# Patient Record
Sex: MALE | Race: WHITE | NOT HISPANIC OR LATINO | Employment: FULL TIME
[De-identification: names, ages, dates, MRNs, and addresses within clinical notes are randomized per-mention and may not be internally consistent; named-entity substitution may affect disease eponyms.]

---

## 2019-10-01 ENCOUNTER — HEALTH MAINTENANCE LETTER (OUTPATIENT)
Age: 49
End: 2019-10-01

## 2021-01-04 ENCOUNTER — HOSPITAL ENCOUNTER (EMERGENCY)
Facility: CLINIC | Age: 51
Discharge: HOME | End: 2021-01-04
Attending: EMERGENCY MEDICINE | Admitting: EMERGENCY MEDICINE
Payer: OTHER GOVERNMENT

## 2021-01-04 ENCOUNTER — APPOINTMENT (OUTPATIENT)
Dept: CT IMAGING | Facility: CLINIC | Age: 51
End: 2021-01-04
Attending: EMERGENCY MEDICINE
Payer: OTHER GOVERNMENT

## 2021-01-04 VITALS
BODY MASS INDEX: 30.43 KG/M2 | SYSTOLIC BLOOD PRESSURE: 134 MMHG | RESPIRATION RATE: 14 BRPM | DIASTOLIC BLOOD PRESSURE: 94 MMHG | TEMPERATURE: 98 F | WEIGHT: 250 LBS | HEART RATE: 89 BPM | OXYGEN SATURATION: 98 %

## 2021-01-04 DIAGNOSIS — K52.9 COLITIS: ICD-10-CM

## 2021-01-04 LAB
ALBUMIN SERPL-MCNC: 3.6 G/DL (ref 3.4–5)
ALBUMIN UR-MCNC: 10 MG/DL
ALP SERPL-CCNC: 78 U/L (ref 40–150)
ALT SERPL W P-5'-P-CCNC: 29 U/L (ref 0–70)
ANION GAP SERPL CALCULATED.3IONS-SCNC: <1 MMOL/L (ref 3–14)
APPEARANCE UR: CLEAR
AST SERPL W P-5'-P-CCNC: 24 U/L (ref 0–45)
BASOPHILS # BLD AUTO: 0.1 10E9/L (ref 0–0.2)
BASOPHILS NFR BLD AUTO: 0.6 %
BILIRUB SERPL-MCNC: 0.7 MG/DL (ref 0.2–1.3)
BILIRUB UR QL STRIP: NEGATIVE
BUN SERPL-MCNC: 15 MG/DL (ref 7–30)
CALCIUM SERPL-MCNC: 8.7 MG/DL (ref 8.5–10.1)
CHLORIDE SERPL-SCNC: 103 MMOL/L (ref 94–109)
CO2 SERPL-SCNC: 32 MMOL/L (ref 20–32)
COLOR UR AUTO: YELLOW
CREAT SERPL-MCNC: 1.31 MG/DL (ref 0.66–1.25)
DIFFERENTIAL METHOD BLD: NORMAL
EOSINOPHIL # BLD AUTO: 0.1 10E9/L (ref 0–0.7)
EOSINOPHIL NFR BLD AUTO: 1.5 %
ERYTHROCYTE [DISTWIDTH] IN BLOOD BY AUTOMATED COUNT: 12.9 % (ref 10–15)
GFR SERPL CREATININE-BSD FRML MDRD: 63 ML/MIN/{1.73_M2}
GLUCOSE SERPL-MCNC: 83 MG/DL (ref 70–99)
GLUCOSE UR STRIP-MCNC: NEGATIVE MG/DL
HCT VFR BLD AUTO: 50.2 % (ref 40–53)
HGB BLD-MCNC: 16.6 G/DL (ref 13.3–17.7)
HGB UR QL STRIP: ABNORMAL
IMM GRANULOCYTES # BLD: 0 10E9/L (ref 0–0.4)
IMM GRANULOCYTES NFR BLD: 0.3 %
KETONES UR STRIP-MCNC: NEGATIVE MG/DL
LEUKOCYTE ESTERASE UR QL STRIP: NEGATIVE
LIPASE SERPL-CCNC: 101 U/L (ref 73–393)
LYMPHOCYTES # BLD AUTO: 2 10E9/L (ref 0.8–5.3)
LYMPHOCYTES NFR BLD AUTO: 23.7 %
MCH RBC QN AUTO: 29.2 PG (ref 26.5–33)
MCHC RBC AUTO-ENTMCNC: 33.1 G/DL (ref 31.5–36.5)
MCV RBC AUTO: 88 FL (ref 78–100)
MONOCYTES # BLD AUTO: 0.9 10E9/L (ref 0–1.3)
MONOCYTES NFR BLD AUTO: 10.1 %
MUCOUS THREADS #/AREA URNS LPF: PRESENT /LPF
NEUTROPHILS # BLD AUTO: 5.5 10E9/L (ref 1.6–8.3)
NEUTROPHILS NFR BLD AUTO: 63.8 %
NITRATE UR QL: NEGATIVE
NRBC # BLD AUTO: 0 10*3/UL
NRBC BLD AUTO-RTO: 0 /100
PH UR STRIP: 5.5 PH (ref 5–7)
PLATELET # BLD AUTO: 276 10E9/L (ref 150–450)
POTASSIUM SERPL-SCNC: 3.9 MMOL/L (ref 3.4–5.3)
PROT SERPL-MCNC: 7.9 G/DL (ref 6.8–8.8)
RBC # BLD AUTO: 5.68 10E12/L (ref 4.4–5.9)
RBC #/AREA URNS AUTO: 3 /HPF (ref 0–2)
SODIUM SERPL-SCNC: 135 MMOL/L (ref 133–144)
SOURCE: ABNORMAL
SP GR UR STRIP: 1.02 (ref 1–1.03)
SQUAMOUS #/AREA URNS AUTO: <1 /HPF (ref 0–1)
UROBILINOGEN UR STRIP-MCNC: 2 MG/DL (ref 0–2)
WBC # BLD AUTO: 8.6 10E9/L (ref 4–11)
WBC #/AREA URNS AUTO: 1 /HPF (ref 0–5)

## 2021-01-04 PROCEDURE — 87506 IADNA-DNA/RNA PROBE TQ 6-11: CPT | Performed by: EMERGENCY MEDICINE

## 2021-01-04 PROCEDURE — 81001 URINALYSIS AUTO W/SCOPE: CPT | Performed by: EMERGENCY MEDICINE

## 2021-01-04 PROCEDURE — 80053 COMPREHEN METABOLIC PANEL: CPT | Performed by: EMERGENCY MEDICINE

## 2021-01-04 PROCEDURE — 87636 SARSCOV2 & INF A&B AMP PRB: CPT | Performed by: EMERGENCY MEDICINE

## 2021-01-04 PROCEDURE — 83690 ASSAY OF LIPASE: CPT | Performed by: EMERGENCY MEDICINE

## 2021-01-04 PROCEDURE — 74177 CT ABD & PELVIS W/CONTRAST: CPT

## 2021-01-04 PROCEDURE — 99285 EMERGENCY DEPT VISIT HI MDM: CPT | Mod: 25

## 2021-01-04 PROCEDURE — C9803 HOPD COVID-19 SPEC COLLECT: HCPCS

## 2021-01-04 PROCEDURE — 250N000011 HC RX IP 250 OP 636: Performed by: EMERGENCY MEDICINE

## 2021-01-04 PROCEDURE — 250N000009 HC RX 250: Performed by: EMERGENCY MEDICINE

## 2021-01-04 PROCEDURE — 87493 C DIFF AMPLIFIED PROBE: CPT | Mod: 59 | Performed by: EMERGENCY MEDICINE

## 2021-01-04 PROCEDURE — 85025 COMPLETE CBC W/AUTO DIFF WBC: CPT | Performed by: EMERGENCY MEDICINE

## 2021-01-04 RX ORDER — IOPAMIDOL 755 MG/ML
500 INJECTION, SOLUTION INTRAVASCULAR ONCE
Status: COMPLETED | OUTPATIENT
Start: 2021-01-04 | End: 2021-01-04

## 2021-01-04 RX ADMIN — SODIUM CHLORIDE 65 ML: 9 INJECTION, SOLUTION INTRAVENOUS at 20:28

## 2021-01-04 RX ADMIN — IOPAMIDOL 100 ML: 755 INJECTION, SOLUTION INTRAVENOUS at 20:28

## 2021-01-04 ASSESSMENT — ENCOUNTER SYMPTOMS
NAUSEA: 0
VOMITING: 0
FEVER: 0
ABDOMINAL PAIN: 1

## 2021-01-04 NOTE — ED AVS SNAPSHOT
[facility] Emergency [facility]  [address]  [facility] [address]  Phone: [phone]  Fax: [phone]                                    Kishan Bazzi Jr   MRN: [id number]    Department: [facility] Emergency Dept   Date of Visit: 1/4/2021           After Visit Summary Signature Page    I have received my discharge instructions, and my questions have been answered. I have discussed any challenges I see with this plan with the nurse or doctor.    ..........................................................................................................................................  Patient/Patient Representative Signature      ..........................................................................................................................................  Patient Representative Print Name and Relationship to Patient    ..................................................               ................................................  Date                                   Time    ..........................................................................................................................................  Reviewed by Signature/Title    ...................................................              ..............................................  Date                                               Time          22EPIC Rev 08/18

## 2021-01-05 ENCOUNTER — TELEPHONE (OUTPATIENT)
Dept: EMERGENCY MEDICINE | Facility: CLINIC | Age: 51
End: 2021-01-05

## 2021-01-05 LAB
C COLI+JEJUNI+LARI FUSA STL QL NAA+PROBE: ABNORMAL
C DIFF TOX B STL QL: NEGATIVE
EC STX1 GENE STL QL NAA+PROBE: NOT DETECTED
EC STX2 GENE STL QL NAA+PROBE: NOT DETECTED
ENTERIC PATHOGEN COMMENT: ABNORMAL
FLUABV+SARS-COV-2+RSV PNL RESP NAA+PROBE: NEGATIVE
FLUABV+SARS-COV-2+RSV PNL RESP NAA+PROBE: NEGATIVE
LABORATORY COMMENT REPORT: NORMAL
NOROV GI+II ORF1-ORF2 JNC STL QL NAA+PR: NOT DETECTED
RSV RNA SPEC QL NAA+PROBE: NEGATIVE
RVA NSP5 STL QL NAA+PROBE: NOT DETECTED
SALMONELLA SP RPOD STL QL NAA+PROBE: NOT DETECTED
SARS-COV-2 RNA SPEC QL NAA+PROBE: NEGATIVE
SHIGELLA SP+EIEC IPAH STL QL NAA+PROBE: NOT DETECTED
SPECIMEN SOURCE: NORMAL
SPECIMEN SOURCE: NORMAL
V CHOL+PARA RFBL+TRKH+TNAA STL QL NAA+PR: NOT DETECTED
Y ENTERO RECN STL QL NAA+PROBE: NOT DETECTED

## 2021-01-05 NOTE — TELEPHONE ENCOUNTER
"[facility] Emergency Department/Urgent Care Lab result notification     Patient/parent Name  Robby    RN Assessment (Patient s current Symptoms), include time called.  [Insert Left message here if message left]  1:35PM: Patient returned call. States that he is feeling the same as yesterday, not worse. Continues to have some \"pressure\" in the right lower abdomen and is having loose stools. Has been able to eat, has drank some coffee and has drank 4 glasses of water today. Urinating normal amounts. Denies any nausea, vomiting or fever.   Lab result (if applicable):  Final Enteric Bacteria and Virus Panel by JOYCELYN Stool is POSITIVE for Campylobacter group by JOYCELYN  Patient to be notified, symptom's assessed and advised per [place] ED lab result protocol.  RN Recommendations/Instructions per [place] ED lab result protocol  Patient notified of lab result and treatment recommendations.    RN reviewed information about Campylobacter from protocol RN action and RN reference guide.   Advised to not use any antidiarrheal medications, wash hands well.   Advised to follow up with the PCP as directed by the ED provider.  The patient is comfortable with the information given and has no further questions.     Please Contact your PCP clinic or return to the Emergency department if your    Symptoms worsen or other concerning symptom's.    PCP follow-up Questions asked: YES       [RN Name]  Becky Fish RN  [facility] RN  Lung Nodule and ED Lab Result RN  Epic pool (ED late result f/u RN): [id number]  [facility] INCIDENTAL RADIOLOGY F/U NURSES: P [id number]  # [phone]      "

## 2021-01-05 NOTE — ED PROVIDER NOTES
History   Chief Complaint:  Abdominal Pain       HPI   Kishan Bazzi Jr is a 50 year old male with history of intussusception who presents with abdominal pain. Per the patient, for the past 2 days he has had worsening RLQ abdominal pain which worsens with movement. He states it feels similar to one of his prior episodes of intussusception. He also endorses loose, yellow, stools but not quite diarrhea. He denies fever, nausea, and vomiting.  He notes his daughter is sick with similar symptoms, and currently has stool testing pending.  She was apparently vomiting 3 days ago.    Review of Systems   Constitutional: Negative for fever.   Gastrointestinal: Positive for abdominal pain. Negative for nausea and vomiting.   All other systems reviewed and are negative.      Allergies:  The patient has no known allergies.     Medications:  Allegra    Past Medical History:    Lipoma  Umbilical hernia  Asthma  Allergic rhinitis   Intussusception    Past Surgical History:    Ventral hernia repair  Appendectomy  Tonsillectomy and adenoidectomy  Biceps tendon repair, left    Social History:  The patient was unaccompanied to the ER  Alcohol Use: Negative  Tobacco Use: Negative    Physical Exam     Patient Vitals for the past 24 hrs:   BP Temp Temp src Pulse Resp SpO2 Weight   01/04/21 2000 119/89 -- -- 89 -- 99 % --   01/04/21 1828 138/88 98  F (36.7  C) Oral 83 14 97 % 113.4 kg (250 lb)       Physical Exam   Nursing note and vitals reviewed.  Constitutional: Cooperative.   HENT:   Mouth/Throat: Moist mucous membranes.   Eyes: EOMI, nonicteric sclera  Cardiovascular: Normal rate, regular rhythm, no murmurs, rubs, or gallops  Pulmonary/Chest: Effort normal and breath sounds normal. No respiratory distress. No wheezes. No rales.   Abdominal: Soft. Mild right lower quadrant TTP, nondistended, no guarding or rigidity.   Musculoskeletal: Normal range of motion.   Neurological: Alert. Moves all extremities spontaneously.   Skin: Skin  is warm and dry. No rash noted.   Psychiatric: Normal mood and affect.     Emergency Department Course     Imaging:    Abd/pelvis CT,  IV  contrast only TRAUMA / AAA   1.  Inflammatory wall thickening involving the distal ileum and much of the hemicolon consistent with an inflammatory/infectious ileocolitis.  Imaging independently reviewed and agree with radiologist interpretation.     The above imaging workup was performed.     Laboratory:    CBC: WBC 8.6, HGB 16.6,   CMP: Anion Gap <1(L), Creatinine 1.31(H) o/w WNL  Lipase: 101    UA with micro: Blood Trace(A), Protein Albumin 10(A), RBC 3(H), Mucous Present(A) o/w WNL    Symptomatic COVID-19 and Influenza by PCR Swab: In Process    Emergency Department Course:    Reviewed:  I reviewed nursing notes, vitals, past medical history and care everywhere    Assessments:  1957 I performed a physical exam of the patient as documented above.  2112 Patient rechecked and updated. He informed me the daughter was vomiting and had similar symptoms about 3 days ago. He was recently in [place] visiting his mother who tested positive for COVID 2 weeks prior to his arrival.    Disposition:  The patient was discharged to home.       Impression & Plan   Covid-19  Kishan Bazzi Jr was evaluated during a global COVID-19 pandemic, which necessitated consideration that the patient might be at risk for infection with the SARS-CoV-2 virus that causes COVID-19.   Applicable protocols for evaluation were followed during the patient's care.   COVID-19 was considered as part of the patient's evaluation. The plan for testing is:  a test was obtained during this visit.    Medical Decision Making:  Patient presents with chief complaint right lower quadrant abdominal pain for 48 hours.  He has pertinent history of intussusception, and feels that symptoms are similar.  He is not having bloody stool.  Exam is equivocal, but given his history, CT imaging was obtained.  CT is notable  for diffuse ileocolitis.  Given he has a sick contact, this most likely represents infectious colitis.  We did discuss possibility of inflammatory colitis.  Highly doubt ischemic colitis given the distribution.  Stool studies were sent while patient was in ED.  Discussed with him that this is usually a self-limited illness, and should improve on its own.  Zofran prescribed for home.  With otherwise negative CT and unremarkable labs, doubt other emergent pathology.  He is safe/stable for discharge home.  All questions answered.    Diagnosis:    ICD-10-CM    1. Colitis  K52.9      Scribe Disclosure:  I, Wong Yeung, am serving as a scribe at 7:46 PM on 1/4/2021 to document services personally performed by Girish Nunez MD based on my observations and the provider's statements to me.            Girish Nunez MD  01/05/21 6425

## 2021-01-05 NOTE — TELEPHONE ENCOUNTER
[facility] Emergency Department Lab result notification [Adult-Male]    [place] ED lab result protocol used  Campylobacter protocol    Reason for call  Notify of lab results, assess symptoms,  review ED providers recommendations/discharge instructions (if necessary) and advise per ED lab result f/u protocol    Lab Result (including Rx patient on, if applicable)  Final Enteric Bacteria and Virus Panel by JOYCELYN Stool is POSITIVE for Campylobacter group by JOYCELYN  Patient to be notified, symptom's assessed and advised per [place] ED lab result protocol.      Information table from ED Provider visit on 1/4/2020  Symptoms reported at ED visit (Chief complaint, HPI) Abdominal Pain        HPI   Kishan Bazzi Jr is a 50 year old male with history of intussusception who presents with abdominal pain. Per the patient, for the past 2 days he has had worsening RLQ abdominal pain which worsens with movement. He states it feels similar to one of his prior episodes of intussusception. He also endorses loose, yellow, stools but not quite diarrhea. He denies fever, nausea, and vomiting.  He notes his daughter is sick with similar symptoms, and currently has stool testing pending.  She was apparently vomiting 3 days ago.   Significant Medical hx, if applicable (i.e. CKD, diabetes) None   Allergies No Known Allergies   Weight, if applicable Wt Readings from Last 2 Encounters:   01/04/21 113.4 kg (250 lb)   08/27/16 113.4 kg (250 lb)      Coumadin/Warfarin [Yes /No] No   Creatinine Level (mg/dl) Creatinine   Date Value Ref Range Status   01/04/2021 1.31 (H) 0.66 - 1.25 mg/dL Final      Creatinine clearance (ml/min), if applicable Creatinine clearance cannot be calculated (Unknown ideal weight.) CrCl est 108.21   ED providers Impression and Plan (applicable information) Covid-19  Kishan Bazzi Jr was evaluated during a global COVID-19 pandemic, which necessitated consideration that the patient might be at risk for  infection with the SARS-CoV-2 virus that causes COVID-19.   Applicable protocols for evaluation were followed during the patient's care.   COVID-19 was considered as part of the patient's evaluation. The plan for testing is:  a test was obtained during this visit.     Medical Decision Making:  Patient presents with chief complaint right lower quadrant abdominal pain for 48 hours.  He has pertinent history of intussusception, and feels that symptoms are similar.  He is not having bloody stool.  Exam is equivocal, but given his history, CT imaging was obtained.  CT is notable for diffuse ileocolitis.  Given he has a sick contact, this most likely represents infectious colitis.  We did discuss possibility of inflammatory colitis.  Highly doubt ischemic colitis given the distribution.  Stool studies were sent while patient was in ED.  Discussed with him that this is usually a self-limited illness, and should improve on its own.  Zofran prescribed for home.  With otherwise negative CT and unremarkable labs, doubt other emergent pathology.  He is safe/stable for discharge home.  All questions answered.   ED diagnosis Colitis    ED provider Girish Nunez MD      RN Assessment (Patient s current Symptoms), include time called.  [Insert Left message here if message left]  10:33AM: Left voicemail message requesting a call back to [facility] ED Lab Result RN at [phone].  RN is available every day between 10 a.m. and 6:30 p.m..      [RN Name]  Becky Fish RN  [facility] RN  Lung Nodule and ED Lab Result RN  Epic pool (ED late result f/u RN): [id number]  [facility] INCIDENTAL RADIOLOGY F/U NURSES: P [id number]  Ph# [phone]      Copy of Lab result  Enteric Bacteria and Virus Panel by JOYCELYN Stool  Order: [id number]  Status:  Final result   Visible to patient:  Yes (MyChart) Dx:  Colitis  Specimen Information: Feces          Ref Range & Units 1d ago    Campylobacter group by JOYCELYN NDET^Not Detected Detected,  Abnormal ResultAbnormal      Salmonella species by JOYCELYN NDET^Not Detected Not Detected     Shigella species by JOYCELYN NDET^Not Detected Not Detected     Vibrio group by JOYCELYN NDET^Not Detected Not Detected     Rotavirus A by JOYCELYN NDET^Not Detected Not Detected     Shiga toxin 1 gene by JOYCELYN NDET^Not Detected Not Detected     Shiga toxin 2 gene by JOYCELYN NDET^Not Detected Not Detected     Norovirus I and II by JOYCELYN NDET^Not Detected Not Detected     Yersinia enterocolitica by JOYCELYN NDET^Not Detected Not Detected     Enteric pathogen comment  Testing performed by multiplexed, qualitative PCR using the [employer] Enteric   Pathogens Nucleic Acid Test. Results should not be used as the sole basis for diagnosis,   treatment, or other patient management decisions.    Comment: Positive results do not rule out co-infection with other organisms that are   not detected by this test, and may not be the sole or definitive cause of   patient illness.   Negative results in the setting of clinical illness compatible with   gastroenteritis may be due to infection by pathogens that are not detected by   this test or non-infectious causes such as ulcerative colitis, irritable bowel    syndrome, or Crohn's disease.   Note: Shiga toxin producing E. coli (STEC) typically harbor one or both genes   that encode for Shiga toxins 1 and 2.    Resulting Agency  [facility]         Specimen Collected: 01/04/21  9:50 PM Last Resulted: 01/05/21  6:57 AM

## 2021-01-05 NOTE — RESULT ENCOUNTER NOTE
Final Clostridium Difficile toxin B PCR is NEGATIVE.    No treatment or change in treatment per [place] ED Lab Result protocol.

## 2021-01-05 NOTE — ED TRIAGE NOTES
A&O x4, ABCs intact. Pt presents with RLQ abdominal pain that started 2 days ago. Pt denies nausea. Pt states that he was at urgent care and was sent to ED.

## 2021-01-15 ENCOUNTER — HEALTH MAINTENANCE LETTER (OUTPATIENT)
Age: 51
End: 2021-01-15

## 2021-09-04 ENCOUNTER — HEALTH MAINTENANCE LETTER (OUTPATIENT)
Age: 51
End: 2021-09-04

## 2022-02-13 ENCOUNTER — HEALTH MAINTENANCE LETTER (OUTPATIENT)
Age: 52
End: 2022-02-13

## 2022-08-20 ENCOUNTER — HOSPITAL ENCOUNTER (EMERGENCY)
Facility: CLINIC | Age: 52
Discharge: HOME | End: 2022-08-20
Attending: EMERGENCY MEDICINE | Admitting: EMERGENCY MEDICINE
Payer: OTHER GOVERNMENT

## 2022-08-20 VITALS
TEMPERATURE: 98.6 F | SYSTOLIC BLOOD PRESSURE: 157 MMHG | DIASTOLIC BLOOD PRESSURE: 107 MMHG | HEART RATE: 88 BPM | HEIGHT: 76 IN | RESPIRATION RATE: 18 BRPM | BODY MASS INDEX: 31.66 KG/M2 | OXYGEN SATURATION: 99 % | WEIGHT: 260 LBS

## 2022-08-20 DIAGNOSIS — K04.7 DENTAL ABSCESS: ICD-10-CM

## 2022-08-20 PROCEDURE — 99283 EMERGENCY DEPT VISIT LOW MDM: CPT

## 2022-08-20 PROCEDURE — 64400 NJX AA&/STRD TRIGEMINAL NRV: CPT

## 2022-08-20 RX ORDER — HYDROCODONE BITARTRATE AND ACETAMINOPHEN 5; 325 MG/1; MG/1
1 TABLET ORAL EVERY 6 HOURS PRN
Qty: 10 TABLET | Refills: 0 | Status: SHIPPED | OUTPATIENT
Start: 2022-08-20

## 2022-08-20 RX ORDER — CLINDAMYCIN HCL 300 MG
300 CAPSULE ORAL 4 TIMES DAILY
Qty: 40 CAPSULE | Refills: 0 | Status: SHIPPED | OUTPATIENT
Start: 2022-08-20 | End: 2022-08-30

## 2022-08-20 ASSESSMENT — ENCOUNTER SYMPTOMS
FEVER: 0
VOMITING: 0
NAUSEA: 0
DIARRHEA: 0

## 2022-08-20 NOTE — DISCHARGE INSTRUCTIONS
Please follow up with your dentist as soon as possible.    Discharge Instructions  Dental Pain    You have been seen today for a toothache. Your pain may be caused by an exposed nerve, an infection (pulpitis), a root abscess (pocket of pus), or other problems. You will need to see a dentist for a solution to your tooth problem. Emergency Department care is only to help control your problem until you can see a dentist; we cannot provide complete dental care.  Today, we did not find any sign that your toothache was caused by any dangerous or life-threatening condition, but sometimes symptoms develop over time and cannot be found during an emergency visit, so it is very important that you follow up with your dentist.      Generally, every Emergency Department visit should have a follow-up clinic visit with either a primary or a specialty clinic/provider. Please follow-up as instructed by your emergency provider today.    Return to the Emergency Department if:  You develop a new fever over 100.4 F.  You cannot open your mouth normally, cannot move your tongue well, or cannot swallow.  You have new or increased swelling of your face or neck.  You develop drainage of pus or foul smelling material from around your tooth.  What can I do to help myself?  Take any antibiotic the provider may have prescribed for you today.  Avoid very hot or very cold foods as both can cause pain.  Make an appointment to see a dentist as soon as possible. Dentists are generally not  on-staff  at hospitals so we cannot  refer  to you to dentist but we may be able to provide a list of dental clinics to help you.  If you were given a prescription for medicine here today, be sure to read all of the information (including the package insert) that comes with your prescription.  This will include important information about the medicine, its side effects, and any warnings that you need to know about.  The pharmacist who fills the prescription can  provide more information and answer questions you may have about the medicine.  If you have questions or concerns that the pharmacist cannot address, please call or return to the Emergency Department.   Remember that you can always come back to the Emergency Department if you are not able to see your regular provider in the amount of time listed above, if you get any new symptoms, or if there is anything that worries you.

## 2022-08-20 NOTE — ED TRIAGE NOTES
Patient reports right upper tooth pain since yesterday with progressive facial swelling today.  He is able to open and close with mouth fully and denies swelling under his tongue.He took tylenol and ibuprofen prior to coming to the ER but these medications have not improved the pain.  ABCs intact.  Patient is alert and oriented x3.

## 2022-10-16 ENCOUNTER — HEALTH MAINTENANCE LETTER (OUTPATIENT)
Age: 52
End: 2022-10-16

## 2023-02-24 ENCOUNTER — APPOINTMENT (OUTPATIENT)
Dept: GENERAL RADIOLOGY | Facility: CLINIC | Age: 53
End: 2023-02-24
Attending: EMERGENCY MEDICINE
Payer: OTHER GOVERNMENT

## 2023-02-24 ENCOUNTER — HOSPITAL ENCOUNTER (EMERGENCY)
Facility: CLINIC | Age: 53
Discharge: HOME | End: 2023-02-24
Attending: EMERGENCY MEDICINE | Admitting: EMERGENCY MEDICINE
Payer: OTHER GOVERNMENT

## 2023-02-24 VITALS
DIASTOLIC BLOOD PRESSURE: 113 MMHG | TEMPERATURE: 98.1 F | OXYGEN SATURATION: 99 % | RESPIRATION RATE: 18 BRPM | HEART RATE: 78 BPM | SYSTOLIC BLOOD PRESSURE: 155 MMHG

## 2023-02-24 DIAGNOSIS — R00.2 PALPITATIONS: ICD-10-CM

## 2023-02-24 DIAGNOSIS — R03.0 ELEVATED BLOOD PRESSURE READING WITHOUT DIAGNOSIS OF HYPERTENSION: ICD-10-CM

## 2023-02-24 DIAGNOSIS — R07.9 CHEST PAIN, UNSPECIFIED TYPE: ICD-10-CM

## 2023-02-24 LAB
ANION GAP SERPL CALCULATED.3IONS-SCNC: 12 MMOL/L (ref 7–15)
BASOPHILS # BLD AUTO: 0.1 10E3/UL (ref 0–0.2)
BASOPHILS NFR BLD AUTO: 1 %
BUN SERPL-MCNC: 16.9 MG/DL (ref 6–20)
CALCIUM SERPL-MCNC: 9.3 MG/DL (ref 8.6–10)
CHLORIDE SERPL-SCNC: 100 MMOL/L (ref 98–107)
CREAT SERPL-MCNC: 1.03 MG/DL (ref 0.67–1.17)
D DIMER PPP FEU-MCNC: 0.31 UG/ML FEU (ref 0–0.5)
DEPRECATED HCO3 PLAS-SCNC: 24 MMOL/L (ref 22–29)
EOSINOPHIL # BLD AUTO: 0.3 10E3/UL (ref 0–0.7)
EOSINOPHIL NFR BLD AUTO: 6 %
ERYTHROCYTE [DISTWIDTH] IN BLOOD BY AUTOMATED COUNT: 13.3 % (ref 10–15)
GFR SERPL CREATININE-BSD FRML MDRD: 87 ML/MIN/1.73M2
GLUCOSE SERPL-MCNC: 182 MG/DL (ref 70–99)
HCT VFR BLD AUTO: 49 % (ref 40–53)
HGB BLD-MCNC: 16.6 G/DL (ref 13.3–17.7)
HOLD SPECIMEN: NORMAL
IMM GRANULOCYTES # BLD: 0 10E3/UL
IMM GRANULOCYTES NFR BLD: 0 %
LYMPHOCYTES # BLD AUTO: 1.9 10E3/UL (ref 0.8–5.3)
LYMPHOCYTES NFR BLD AUTO: 37 %
MCH RBC QN AUTO: 30.3 PG (ref 26.5–33)
MCHC RBC AUTO-ENTMCNC: 33.9 G/DL (ref 31.5–36.5)
MCV RBC AUTO: 90 FL (ref 78–100)
MONOCYTES # BLD AUTO: 0.3 10E3/UL (ref 0–1.3)
MONOCYTES NFR BLD AUTO: 7 %
NEUTROPHILS # BLD AUTO: 2.4 10E3/UL (ref 1.6–8.3)
NEUTROPHILS NFR BLD AUTO: 49 %
NRBC # BLD AUTO: 0 10E3/UL
NRBC BLD AUTO-RTO: 0 /100
PLATELET # BLD AUTO: 252 10E3/UL (ref 150–450)
POTASSIUM SERPL-SCNC: 3.9 MMOL/L (ref 3.4–5.3)
RBC # BLD AUTO: 5.47 10E6/UL (ref 4.4–5.9)
SODIUM SERPL-SCNC: 136 MMOL/L (ref 136–145)
TROPONIN T SERPL HS-MCNC: 6 NG/L
TROPONIN T SERPL HS-MCNC: 7 NG/L
WBC # BLD AUTO: 5 10E3/UL (ref 4–11)

## 2023-02-24 PROCEDURE — 71046 X-RAY EXAM CHEST 2 VIEWS: CPT

## 2023-02-24 PROCEDURE — 80048 BASIC METABOLIC PNL TOTAL CA: CPT | Performed by: EMERGENCY MEDICINE

## 2023-02-24 PROCEDURE — 85025 COMPLETE CBC W/AUTO DIFF WBC: CPT | Performed by: EMERGENCY MEDICINE

## 2023-02-24 PROCEDURE — 36415 COLL VENOUS BLD VENIPUNCTURE: CPT | Performed by: EMERGENCY MEDICINE

## 2023-02-24 PROCEDURE — 84484 ASSAY OF TROPONIN QUANT: CPT | Mod: 91 | Performed by: EMERGENCY MEDICINE

## 2023-02-24 PROCEDURE — 99285 EMERGENCY DEPT VISIT HI MDM: CPT | Mod: 25

## 2023-02-24 PROCEDURE — 84484 ASSAY OF TROPONIN QUANT: CPT | Performed by: EMERGENCY MEDICINE

## 2023-02-24 PROCEDURE — 85379 FIBRIN DEGRADATION QUANT: CPT | Performed by: EMERGENCY MEDICINE

## 2023-02-24 PROCEDURE — 93005 ELECTROCARDIOGRAM TRACING: CPT

## 2023-02-24 ASSESSMENT — ENCOUNTER SYMPTOMS
DIAPHORESIS: 0
SHORTNESS OF BREATH: 0
FEVER: 0
COUGH: 0
ABDOMINAL PAIN: 0

## 2023-02-24 ASSESSMENT — ACTIVITIES OF DAILY LIVING (ADL): ADLS_ACUITY_SCORE: 35

## 2023-02-24 NOTE — ED PROVIDER NOTES
History     Chief Complaint:  Chest pain, palpitations     HPI   Kishan Bazzi Jr is a 52 year old male with a history of Asthma who presents with chest pressure and dizziness. The patient states he first felt the chest pressure at around 1200 yesterday after shoveling snow. He describes his chest pressure as feeling heaviness in his chest, and states that he still feels the heaviness now, but it is not as bad as yesterday. He reports his chest pressure as a tight, high stress feeling, and states that the pressure feels different than lung tightness. The patient states yesterday night, his heart was pounding and that he felt dizzy. He states today he still felt dizzy and decided to come to the ED. The patient reports minor pains in his jaw and some dull shoulder pain, but states he is unsure if this is related to his chest pressure. The patient states that his chest pressure is intermittent, and today upon arrival to the ED at 1200, was the worst the chest pressure felt since onset. He states that the chest pressure is not present currently, but still has dizziness when walking.  He states that he tried his inhaler for his symptoms and felt slightly better, but this did not resolve his symptoms. The patient reports he chews tobacco, but denies smoking. He denies shortness of breath, pain when breathing, leg swelling, recent illnesses, fever, cough, rhinorrhea, diaphoresis, nausea, history of heart issues, hypercholesterolemia, diabetes, recent surgeries, recent travel, and abdominal pain. Of note, the patient states his grandfather had CHF.     Independent Historian:   None - Patient Only    Review of External Notes: n/a     ROS:  Review of Systems   Constitutional: Negative for diaphoresis and fever.   Respiratory: Negative for cough and shortness of breath.    Cardiovascular: Positive for chest pain. Negative for leg swelling.   Gastrointestinal: Negative for abdominal pain.       Allergies:  The patient has  no known allergies.    Medications:    Advair    Past Medical History:    Asthma  Intussusception  Lipoma  Umbilical hernia  Allergic rhinitis    Past Surgical History:    Appendectomy  Ventral hernia repair  T&A  Left bicep tendon repair  Left ankle surgery    Social History:  The patient presents to the ED alone.  The patient arrived by private vehicle.   reports that he has never smoked. He does not have any smokeless tobacco history on file. He reports current alcohol use. He reports that he does not use drugs.  PCP: Craig Lane     Physical Exam     Patient Vitals for the past 24 hrs:   BP Temp Pulse Resp SpO2   02/24/23 1859 -- -- -- -- 99 %   02/24/23 1844 (!) 155/113 -- 78 -- 99 %   02/24/23 1829 (!) 156/109 -- 80 -- 95 %   02/24/23 1825 -- -- -- -- 98 %   02/24/23 1814 -- -- 69 -- 99 %   02/24/23 1810 (!) 141/103 -- -- -- 97 %   02/24/23 1755 (!) 152/102 -- -- -- 97 %   02/24/23 1740 (!) 150/108 -- -- -- 99 %   02/24/23 1729 -- -- -- -- 100 %   02/24/23 1725 (!) 145/105 -- -- -- 98 %   02/24/23 1714 (!) 148/95 -- 75 -- 99 %   02/24/23 1659 (!) 143/94 -- 74 -- 100 %   02/24/23 1646 129/88 -- 76 -- 97 %   02/24/23 1317 (!) 143/104 98.1  F (36.7  C) 81 18 98 %        Physical Exam  General: Alert, no acute distress  HEENT:  Moist mucous membranes. Conjunctiva normal.   CV:  RRR, no m/r/g, skin warm and well perfused  Pulm:  CTAB, no wheezes/ronchi/rales.  No acute distress, breathing comfortably  GI:  Soft, nontender, nondistended.  No rebound or guarding.    MSK:  Moving all extremities.  No focal areas of edema, erythema, or tenderness  Skin:  WWP, no rashes, skin color normal, no diaphoresis  Psych:  Well-appearing, normal affect, regular speech    Emergency Department Course   ECG  ECG taken at 1627, ECG read at 1640  Normal sinus rhythm  Normal ECG   No significant change as compared to prior, dated 8/25/16.  Rate 75 bpm. KS interval 192 ms. QRS duration 94 ms. QT/QTc 372/415 ms. P-R-T axes 20 -18 17.        Imaging:  Chest XR,  PA & LAT   Final Result   IMPRESSION: Stable chest with no acute cardiopulmonary findings seen to explain patient's chest pain clinically.         Report per radiology    Laboratory:  Labs Ordered and Resulted from Time of ED Arrival to Time of ED Departure   BASIC METABOLIC PANEL - Abnormal       Result Value    Sodium 136      Potassium 3.9      Chloride 100      Carbon Dioxide (CO2) 24      Anion Gap 12      Urea Nitrogen 16.9      Creatinine 1.03      Calcium 9.3      Glucose 182 (*)     GFR Estimate 87     TROPONIN T, HIGH SENSITIVITY - Normal    Troponin T, High Sensitivity 7     D DIMER QUANTITATIVE - Normal    D-Dimer Quantitative 0.31     TROPONIN T, HIGH SENSITIVITY - Normal    Troponin T, High Sensitivity 6     CBC WITH PLATELETS AND DIFFERENTIAL    WBC Count 5.0      RBC Count 5.47      Hemoglobin 16.6      Hematocrit 49.0      MCV 90      MCH 30.3      MCHC 33.9      RDW 13.3      Platelet Count 252      % Neutrophils 49      % Lymphocytes 37      % Monocytes 7      % Eosinophils 6      % Basophils 1      % Immature Granulocytes 0      NRBCs per 100 WBC 0      Absolute Neutrophils 2.4      Absolute Lymphocytes 1.9      Absolute Monocytes 0.3      Absolute Eosinophils 0.3      Absolute Basophils 0.1      Absolute Immature Granulocytes 0.0      Absolute NRBCs 0.0          Emergency Department Course & Assessments:         Independent Interpretation (X-rays, CTs, rhythm strip):  Chest x-ray shows no pneumothorax or lobar consolidation.    Consultations/Discussion of Management or Tests:  N/A       Social Determinants of Health affecting care:   None    Assessments:  1635 I checked the patient and obtained history.   I rechecked the patient and explained findings and discussed discharge.    Disposition:  The patient was discharged to home.     Impression & Plan      Medical Decision Makin-year-old male with history of asthma presenting to the ER for evaluation of chest  pain and palpitations with associated dizziness.  Please above for details of HPI and exam.  He is afebrile but noted to be slightly hypertensive.  He is well-appearing and not in respiratory distress.  Broad differentials considered including but is not limited to ACS, PE, aortic dissection, pneumonia, pneumothorax, GI etiology, musculoskeletal etiology, asthma exacerbation amongst other things.  His work-up here in the ER is reassuring.  EKG shows no acute ischemic appearing changes or signs of dysrhythmia.  High-sensitivity troponin and delta troponin are normal.  Patient's HEART score is low.  Furthermore, he is low risk for PE and D-dimer is normal making PE unlikely.  The rest of his basic lab studies are reassuring.  Chest x-ray shows no evidence of pneumothorax or lobar consolidation.  No mediastinal widening and I doubt acute or dissection based on history and exam.  Unclear cause for patient's overall symptoms but given his reassuring work-up, with reasonable clinical certainty, I do feel that he is safe to discharge home to follow-up with his primary care doctor regarding his visit today and with his elevated blood pressure readings.  Despite high blood pressure reading, no signs of endorgan injury.  I discussed with him the signs and symptoms that should prompt serge return to the ER.  All questions answered prior to discharge      Diagnosis:    ICD-10-CM    1. Chest pain, unspecified type  R07.9             Scribe Disclosure:  I, Herb Cross, am serving as a scribe at 5:53 PM on 2/24/2023 to document services personally performed by Brandon Hall MD, based on my observations and the provider's statements to me.   2/24/2023   Brandon Hall MD [place], Edgar Ronald, MD  02/24/23 1956

## 2023-02-25 NOTE — DISCHARGE INSTRUCTIONS
Discharge Instructions  Chest Pain    You have been seen today for chest pain or discomfort.  At this time, your provider has found no signs that your chest pain is due to a serious or life-threatening condition, (or you have declined more testing and/or admission to the hospital). However, sometimes there is a serious problem that does not show up right away. Your evaluation today may not be complete and you may need further testing and evaluation.     Generally, every Emergency Department visit should have a follow-up clinic visit with either a primary or a specialty clinic/provider. Please follow-up as instructed by your emergency provider today.  Return to the Emergency Department if:  Your chest pain changes, gets worse, starts to happen more often, or comes with less activity.  You are newly short of breath.  You get very weak or tired.  You pass out or faint.  You have any new symptoms, like fever, cough, numb legs, or you cough up blood.  You have anything else that worries you.    Until you follow-up with your regular provider, please do the following:  If a stress test appointment has been made, go to the appointment.  If you have questions, contact your regular provider.  Follow-up with your regular provider/clinic as directed; this is very important.    If you were given a prescription for medicine here today, be sure to read all of the information (including the package insert) that comes with your prescription.  This will include important information about the medicine, its side effects, and any warnings that you need to know about.  The pharmacist who fills the prescription can provide more information and answer questions you may have about the medicine.  If you have questions or concerns that the pharmacist cannot address, please call or return to the Emergency Department.       Remember that you can always come back to the Emergency Department if you are not able to see your regular provider in  the amount of time listed above, if you get any new symptoms, or if there is anything that worries you.    Discharge Instructions  Hypertension - High Blood Pressure    During you visit to the Emergency Department, your blood pressure was higher than the recommended blood pressure.  This may be related to stress, pain, medication or other temporary conditions. In these cases, your blood pressure may return to normal on its own. If you have a history of high blood pressure, you may need to have your provider adjust your medications. Sometimes, your high measurement here may indicate that you have developed high blood pressure that will stay high unless it is treated. As a general rule, high blood pressure causes problems over years rather than days, weeks, or months. So, while it is important to treat blood pressure, it is rarely important to treat blood pressure immediately. Occasionally we will begin a medication in the Emergency Department; more often we will recommend close follow-up for medications with a primary doctor/clinic.    Generally, every Emergency Department visit should have a follow-up clinic visit with either a primary or a specialty clinic/provider. Please follow-up as instructed by your emergency provider today.    Return to the Emergency Department if you start to have:  A severe headache.  Chest pain.  Shortness of breath.  Weakness or numbness that affects one part of the body.  Confusion.  Vision changes.  Significant swelling of legs and/or eyes.  A reaction to any medication started in the Emergency Department.    What can I do to help myself?  Avoid alcohol.  Take any blood pressure medicine that you are prescribed.  Get a good night s sleep.  Lower your salt intake.  Exercise.  Lose weight.  Manage stress.  See your doctor regularly    If blood pressure medication was started in the Emergency Department:  The medicine may not have an immediate effect. The body and brain determine what  blood pressure you have. The medicine s job is to retrain the body s  thermostat  to a lower blood pressure.  You will need to follow up with your provider to see how this medicine is working for you.  If you were given a prescription for medicine here today, be sure to read all of the information (including the package insert) that comes with your prescription.  This will include important information about the medicine, its side effects, and any warnings that you need to know about.  The pharmacist who fills the prescription can provide more information and answer questions you may have about the medicine.  If you have questions or concerns that the pharmacist cannot address, please call or return to the Emergency Department.   Remember that you can always come back to the Emergency Department if you are not able to see your regular provider in the amount of time listed above, if you get any new symptoms, or if there is anything that worries you.

## 2023-02-27 LAB
ATRIAL RATE - MUSE: 75 BPM
ATRIAL RATE - MUSE: 75 BPM
DIASTOLIC BLOOD PRESSURE - MUSE: NORMAL MMHG
DIASTOLIC BLOOD PRESSURE - MUSE: NORMAL MMHG
INTERPRETATION ECG - MUSE: NORMAL
INTERPRETATION ECG - MUSE: NORMAL
P AXIS - MUSE: 20 DEGREES
P AXIS - MUSE: 25 DEGREES
PR INTERVAL - MUSE: 184 MS
PR INTERVAL - MUSE: 192 MS
QRS DURATION - MUSE: 84 MS
QRS DURATION - MUSE: 94 MS
QT - MUSE: 364 MS
QT - MUSE: 372 MS
QTC - MUSE: 406 MS
QTC - MUSE: 415 MS
R AXIS - MUSE: -18 DEGREES
R AXIS - MUSE: -9 DEGREES
SYSTOLIC BLOOD PRESSURE - MUSE: NORMAL MMHG
SYSTOLIC BLOOD PRESSURE - MUSE: NORMAL MMHG
T AXIS - MUSE: 17 DEGREES
T AXIS - MUSE: 4 DEGREES
VENTRICULAR RATE- MUSE: 75 BPM
VENTRICULAR RATE- MUSE: 75 BPM

## 2023-03-26 ENCOUNTER — HEALTH MAINTENANCE LETTER (OUTPATIENT)
Age: 53
End: 2023-03-26

## 2023-06-06 ENCOUNTER — HOSPITAL ENCOUNTER (EMERGENCY)
Facility: CLINIC | Age: 53
Discharge: ANOTHER HOSPITAL | End: 2023-06-06
Attending: EMERGENCY MEDICINE | Admitting: EMERGENCY MEDICINE
Payer: COMMERCIAL

## 2023-06-06 VITALS
OXYGEN SATURATION: 99 % | TEMPERATURE: 97 F | DIASTOLIC BLOOD PRESSURE: 74 MMHG | SYSTOLIC BLOOD PRESSURE: 123 MMHG | BODY MASS INDEX: 26.94 KG/M2 | WEIGHT: 221.34 LBS | RESPIRATION RATE: 9 BRPM | HEART RATE: 86 BPM

## 2023-06-06 DIAGNOSIS — E13.10 DIABETIC KETOACIDOSIS WITHOUT COMA ASSOCIATED WITH OTHER SPECIFIED DIABETES MELLITUS (H): ICD-10-CM

## 2023-06-06 LAB
ALBUMIN SERPL BCG-MCNC: 4.5 G/DL (ref 3.5–5.2)
ALBUMIN UR-MCNC: 30 MG/DL
ALP SERPL-CCNC: 142 U/L (ref 40–129)
ALT SERPL W P-5'-P-CCNC: 24 U/L (ref 10–50)
ANION GAP SERPL CALCULATED.3IONS-SCNC: 24 MMOL/L (ref 7–15)
APPEARANCE UR: CLEAR
AST SERPL W P-5'-P-CCNC: 23 U/L (ref 10–50)
B-OH-BUTYR SERPL-SCNC: 7.8 MMOL/L
BASE EXCESS BLDV CALC-SCNC: -11.8 MMOL/L (ref -7.7–1.9)
BASOPHILS # BLD AUTO: 0 10E3/UL (ref 0–0.2)
BASOPHILS NFR BLD AUTO: 0 %
BILIRUB SERPL-MCNC: 0.7 MG/DL
BILIRUB UR QL STRIP: NEGATIVE
BUN SERPL-MCNC: 12.6 MG/DL (ref 6–20)
CALCIUM SERPL-MCNC: 9.9 MG/DL (ref 8.6–10)
CHLORIDE SERPL-SCNC: 95 MMOL/L (ref 98–107)
COLOR UR AUTO: ABNORMAL
CREAT SERPL-MCNC: 1.09 MG/DL (ref 0.67–1.17)
DEPRECATED HCO3 PLAS-SCNC: 16 MMOL/L (ref 22–29)
EOSINOPHIL # BLD AUTO: 0.1 10E3/UL (ref 0–0.7)
EOSINOPHIL NFR BLD AUTO: 1 %
ERYTHROCYTE [DISTWIDTH] IN BLOOD BY AUTOMATED COUNT: 15.4 % (ref 10–15)
GFR SERPL CREATININE-BSD FRML MDRD: 81 ML/MIN/1.73M2
GLUCOSE BLDC GLUCOMTR-MCNC: 219 MG/DL (ref 70–99)
GLUCOSE BLDC GLUCOMTR-MCNC: 256 MG/DL (ref 70–99)
GLUCOSE BLDC GLUCOMTR-MCNC: 269 MG/DL (ref 70–99)
GLUCOSE BLDC GLUCOMTR-MCNC: 349 MG/DL (ref 70–99)
GLUCOSE BLDC GLUCOMTR-MCNC: 354 MG/DL (ref 70–99)
GLUCOSE BLDC GLUCOMTR-MCNC: 380 MG/DL (ref 70–99)
GLUCOSE SERPL-MCNC: 409 MG/DL (ref 70–99)
GLUCOSE UR STRIP-MCNC: >=1000 MG/DL
GRANULAR CAST: 1 /LPF
HBA1C MFR BLD: 16.3 %
HCO3 BLDV-SCNC: 14 MMOL/L (ref 21–28)
HCT VFR BLD AUTO: 50.4 % (ref 40–53)
HGB BLD-MCNC: 17.5 G/DL (ref 13.3–17.7)
HGB UR QL STRIP: NEGATIVE
HOLD SPECIMEN: NORMAL
HOLD SPECIMEN: NORMAL
HYALINE CASTS: 6 /LPF
IMM GRANULOCYTES # BLD: 0 10E3/UL
IMM GRANULOCYTES NFR BLD: 0 %
KETONES UR STRIP-MCNC: >150 MG/DL
LEUKOCYTE ESTERASE UR QL STRIP: NEGATIVE
LYMPHOCYTES # BLD AUTO: 1.8 10E3/UL (ref 0.8–5.3)
LYMPHOCYTES NFR BLD AUTO: 35 %
MCH RBC QN AUTO: 31.6 PG (ref 26.5–33)
MCHC RBC AUTO-ENTMCNC: 34.7 G/DL (ref 31.5–36.5)
MCV RBC AUTO: 91 FL (ref 78–100)
MONOCYTES # BLD AUTO: 0.4 10E3/UL (ref 0–1.3)
MONOCYTES NFR BLD AUTO: 7 %
MUCOUS THREADS #/AREA URNS LPF: PRESENT /LPF
NEUTROPHILS # BLD AUTO: 2.8 10E3/UL (ref 1.6–8.3)
NEUTROPHILS NFR BLD AUTO: 57 %
NITRATE UR QL: NEGATIVE
NRBC # BLD AUTO: 0 10E3/UL
NRBC BLD AUTO-RTO: 0 /100
O2/TOTAL GAS SETTING VFR VENT: 21 %
OXYHGB MFR BLDV: 60 % (ref 70–75)
PCO2 BLDV: 33 MM HG (ref 40–50)
PH BLDV: 7.25 [PH] (ref 7.32–7.43)
PH UR STRIP: 5 [PH] (ref 5–7)
PHOSPHATE SERPL-MCNC: 3.4 MG/DL (ref 2.5–4.5)
PLATELET # BLD AUTO: 193 10E3/UL (ref 150–450)
PO2 BLDV: 33 MM HG (ref 25–47)
POTASSIUM SERPL-SCNC: 5.3 MMOL/L (ref 3.4–5.3)
PROT SERPL-MCNC: 7.7 G/DL (ref 6.4–8.3)
RBC # BLD AUTO: 5.54 10E6/UL (ref 4.4–5.9)
RBC URINE: <1 /HPF
SODIUM SERPL-SCNC: 135 MMOL/L (ref 136–145)
SP GR UR STRIP: 1.03 (ref 1–1.03)
UROBILINOGEN UR STRIP-MCNC: NORMAL MG/DL
WBC # BLD AUTO: 5 10E3/UL (ref 4–11)
WBC URINE: 1 /HPF

## 2023-06-06 PROCEDURE — 84100 ASSAY OF PHOSPHORUS: CPT | Performed by: EMERGENCY MEDICINE

## 2023-06-06 PROCEDURE — 258N000003 HC RX IP 258 OP 636: Performed by: EMERGENCY MEDICINE

## 2023-06-06 PROCEDURE — 36415 COLL VENOUS BLD VENIPUNCTURE: CPT | Performed by: EMERGENCY MEDICINE

## 2023-06-06 PROCEDURE — 82962 GLUCOSE BLOOD TEST: CPT

## 2023-06-06 PROCEDURE — 96361 HYDRATE IV INFUSION ADD-ON: CPT

## 2023-06-06 PROCEDURE — 85025 COMPLETE CBC W/AUTO DIFF WBC: CPT | Performed by: EMERGENCY MEDICINE

## 2023-06-06 PROCEDURE — 80053 COMPREHEN METABOLIC PANEL: CPT | Performed by: EMERGENCY MEDICINE

## 2023-06-06 PROCEDURE — 93005 ELECTROCARDIOGRAM TRACING: CPT

## 2023-06-06 PROCEDURE — 96366 THER/PROPH/DIAG IV INF ADDON: CPT

## 2023-06-06 PROCEDURE — 96365 THER/PROPH/DIAG IV INF INIT: CPT

## 2023-06-06 PROCEDURE — 250N000009 HC RX 250: Performed by: EMERGENCY MEDICINE

## 2023-06-06 PROCEDURE — 99285 EMERGENCY DEPT VISIT HI MDM: CPT | Mod: 25

## 2023-06-06 PROCEDURE — 82010 KETONE BODYS QUAN: CPT | Performed by: EMERGENCY MEDICINE

## 2023-06-06 PROCEDURE — 82805 BLOOD GASES W/O2 SATURATION: CPT | Performed by: EMERGENCY MEDICINE

## 2023-06-06 PROCEDURE — 96360 HYDRATION IV INFUSION INIT: CPT

## 2023-06-06 PROCEDURE — 250N000013 HC RX MED GY IP 250 OP 250 PS 637: Performed by: EMERGENCY MEDICINE

## 2023-06-06 PROCEDURE — 250N000011 HC RX IP 250 OP 636: Performed by: EMERGENCY MEDICINE

## 2023-06-06 PROCEDURE — 83036 HEMOGLOBIN GLYCOSYLATED A1C: CPT | Performed by: EMERGENCY MEDICINE

## 2023-06-06 PROCEDURE — 96375 TX/PRO/DX INJ NEW DRUG ADDON: CPT

## 2023-06-06 PROCEDURE — 81001 URINALYSIS AUTO W/SCOPE: CPT | Performed by: EMERGENCY MEDICINE

## 2023-06-06 RX ORDER — POTASSIUM CHLORIDE 7.45 MG/ML
10 INJECTION INTRAVENOUS ONCE
Status: COMPLETED | OUTPATIENT
Start: 2023-06-06 | End: 2023-06-06

## 2023-06-06 RX ORDER — DEXTROSE MONOHYDRATE 25 G/50ML
25-50 INJECTION, SOLUTION INTRAVENOUS
Status: DISCONTINUED | OUTPATIENT
Start: 2023-06-06 | End: 2023-06-06 | Stop reason: HOSPADM

## 2023-06-06 RX ORDER — SODIUM CHLORIDE 9 MG/ML
1000 INJECTION, SOLUTION INTRAVENOUS CONTINUOUS
Status: DISCONTINUED | OUTPATIENT
Start: 2023-06-06 | End: 2023-06-06 | Stop reason: HOSPADM

## 2023-06-06 RX ORDER — MECLIZINE HYDROCHLORIDE 25 MG/1
25 TABLET ORAL ONCE
Status: COMPLETED | OUTPATIENT
Start: 2023-06-06 | End: 2023-06-06

## 2023-06-06 RX ORDER — ONDANSETRON 2 MG/ML
4 INJECTION INTRAMUSCULAR; INTRAVENOUS ONCE
Status: COMPLETED | OUTPATIENT
Start: 2023-06-06 | End: 2023-06-06

## 2023-06-06 RX ADMIN — ONDANSETRON 4 MG: 2 INJECTION INTRAMUSCULAR; INTRAVENOUS at 13:15

## 2023-06-06 RX ADMIN — MECLIZINE HYDROCHLORIDE 25 MG: 25 TABLET ORAL at 12:15

## 2023-06-06 RX ADMIN — SODIUM CHLORIDE 1000 ML: 9 INJECTION, SOLUTION INTRAVENOUS at 15:37

## 2023-06-06 RX ADMIN — SODIUM CHLORIDE, POTASSIUM CHLORIDE, SODIUM LACTATE AND CALCIUM CHLORIDE 1000 ML: 600; 310; 30; 20 INJECTION, SOLUTION INTRAVENOUS at 14:22

## 2023-06-06 RX ADMIN — SODIUM CHLORIDE 5 UNITS/HR: 9 INJECTION, SOLUTION INTRAVENOUS at 14:59

## 2023-06-06 RX ADMIN — SODIUM CHLORIDE, POTASSIUM CHLORIDE, SODIUM LACTATE AND CALCIUM CHLORIDE 1000 ML: 600; 310; 30; 20 INJECTION, SOLUTION INTRAVENOUS at 13:16

## 2023-06-06 ASSESSMENT — ACTIVITIES OF DAILY LIVING (ADL)
ADLS_ACUITY_SCORE: 35
ADLS_ACUITY_SCORE: 33
ADLS_ACUITY_SCORE: 35

## 2023-06-06 NOTE — ED PROVIDER NOTES
History     Chief Complaint:  Dizziness       The history is provided by the patient and the spouse.      Kishan Bazzi Jr is a 53 year old male who presents to the ED via car alone for evaluation of dizziness. Patient reports intermittent dizziness, tunnel or double vision, slurred speech, loss of balance, extremity heaviness, and low energy for the last few days. Patient's wife also notes worsening frequent urination in her , he states he has been drinking a lot of water recently along with polyuria.  He reports a 35 pound weight loss in the last 3-4 months.. He reports nightly muscle cramps, stating that every muscle from the knee down will cramp at some point each night. He has not had a bowel movement for 3 days. He also notes his mouth is constantly dry and does not salivate while eating. Patient denies chest pain, heart palpitations, flu-like symptoms like fever or chills, or other weakness or numbness.  He reports his mother had hyperglycemia, but no family history of diabetes.    Independent Historian:    Spouse/partner    Review of External Notes:  None    Medications:    Norco  Advair  Albuterol  Deltasone    Past Medical History:    Allergic rhinitis  Acute pain right shoulder  Lipoma  Umbilical hernia  Asthma  Intussusception     Past Surgical History:    Hernia repair  Appendectomy  GI surgery      Physical Exam     Patient Vitals for the past 24 hrs:   BP Temp Temp src Pulse Resp SpO2 Weight   06/06/23 1707 123/74 -- -- 86 (!) 9 -- --   06/06/23 1652 131/75 -- -- 83 13 -- --   06/06/23 1637 115/80 -- -- 99 22 -- --   06/06/23 1622 135/86 -- -- 84 12 -- --   06/06/23 1607 125/80 -- -- 82 15 -- --   06/06/23 1552 126/81 -- -- 86 10 -- --   06/06/23 1415 (!) 140/97 -- -- -- -- -- --   06/06/23 1212 (!) 136/97 97  F (36.1  C) Temporal 101 18 99 % 100.4 kg (221 lb 5.5 oz)        Physical Exam      HEENT:    Oropharynx is dry  Eyes:    Conjunctiva normal, PERRL     Extra-ocular movements  intact.     No nystagmus  Neck:    Supple, no meningismus.     CV:     Regular rate and rhythm.      No murmurs, rubs or gallops.       No lower extremity edema.  PULM:    Clear to auscultation bilateral.       No respiratory distress.      Good air exchange.     No rales or wheezing.  ABD:    Soft, non-tender, non-distended.       No pulsatile masses.       No rebound, guarding or rigidity.  MSK:     No gross deformity to all four extremities.   LYMPH:   No cervical lymphadenopathy.  NEURO:   Alert and oriented x 3.      Cranial nerves II-XII intact.     Strength is 5/5 in all 4 extremities.     Sensation intact to all 4 extremities.     Finger to nose normal bilatera     Test of skew normal.  Skin:    Warm, dry and intact.    Psych:    Mood is good and affect is appropriate.          Emergency Department Course   ECG  ECG results from 06/06/23   EKG 12-lead, tracing only     Value    Systolic Blood Pressure     Diastolic Blood Pressure     Ventricular Rate 91    Atrial Rate 91    TX Interval 170    QRS Duration 74        QTc 428    P Axis 24    R AXIS -4    T Axis -12    Interpretation ECG      Sinus rhythm  Low voltage QRS  Inferior infarct , age undetermined  Abnormal ECG  When compared with ECG of 24-FEB-2023 16:27,  Inferior infarct is now Present       Laboratory:  Labs Ordered and Resulted from Time of ED Arrival to Time of ED Departure   COMPREHENSIVE METABOLIC PANEL - Abnormal       Result Value    Sodium 135 (*)     Potassium 5.3      Chloride 95 (*)     Carbon Dioxide (CO2) 16 (*)     Anion Gap 24 (*)     Urea Nitrogen 12.6      Creatinine 1.09      Calcium 9.9      Glucose 409 (*)     Alkaline Phosphatase 142 (*)     AST 23      ALT 24      Protein Total 7.7      Albumin 4.5      Bilirubin Total 0.7      GFR Estimate 81     CBC WITH PLATELETS AND DIFFERENTIAL - Abnormal    WBC Count 5.0      RBC Count 5.54      Hemoglobin 17.5      Hematocrit 50.4      MCV 91      MCH 31.6      MCHC 34.7      RDW  15.4 (*)     Platelet Count 193      % Neutrophils 57      % Lymphocytes 35      % Monocytes 7      % Eosinophils 1      % Basophils 0      % Immature Granulocytes 0      NRBCs per 100 WBC 0      Absolute Neutrophils 2.8      Absolute Lymphocytes 1.8      Absolute Monocytes 0.4      Absolute Eosinophils 0.1      Absolute Basophils 0.0      Absolute Immature Granulocytes 0.0      Absolute NRBCs 0.0     BLOOD GAS VENOUS WITH OXYHEMOGLOBIN - Abnormal    pH Venous 7.25 (*)     pCO2 Venous 33 (*)     pO2 Venous 33      Bicarbonate Venous 14 (*)     FIO2 21      Oxyhemoglobin Venous 60 (*)     Base Excess/Deficit (+/-) -11.8 (*)    KETONE BETA-HYDROXYBUTYRATE QUANTITATIVE, RAPID - Abnormal    Ketone (Beta-Hydroxybutyrate) Quantitative 7.80 (*)    GLUCOSE BY METER - Abnormal    GLUCOSE BY METER POCT 380 (*)    GLUCOSE BY METER - Abnormal    GLUCOSE BY METER POCT 354 (*)    GLUCOSE BY METER - Abnormal    GLUCOSE BY METER POCT 349 (*)    GLUCOSE BY METER - Abnormal    GLUCOSE BY METER POCT 269 (*)    GLUCOSE BY METER - Abnormal    GLUCOSE BY METER POCT 256 (*)    PHOSPHORUS - Normal    Phosphorus 3.4     GLUCOSE MONITOR NURSING POCT   HEMOGLOBIN A1C   GLUCOSE MONITOR NURSING POCT   ROUTINE UA WITH MICROSCOPIC REFLEX TO CULTURE        Emergency Department Course & Assessments:       Interventions:  Medications   dextrose 50 % injection 25-50 mL (has no administration in time range)   sodium chloride 0.9% infusion (1,000 mLs Intravenous $New Bag 6/6/23 1537)   insulin 1 unit/1 mL in NS (NovoLIN, HumuLIN Regular) drip -ED DKA algorithm (7 Units/hr Intravenous Rate/Dose Change 6/6/23 1718)   meclizine (ANTIVERT) tablet 25 mg (25 mg Oral $Given 6/6/23 1215)   0.9% sodium chloride BOLUS (1,000 mLs Intravenous Not Given 6/6/23 1526)   lactated ringers BOLUS 1,000 mL (0 mLs Intravenous Stopped 6/6/23 1526)   ondansetron (ZOFRAN) injection 4 mg (4 mg Intravenous $Given 6/6/23 1315)   potassium chloride 10 mEq in 100 mL sterile water  infusion (10 mEq Intravenous Not Given 23 1525)   lactated ringers BOLUS 1,000 mL (0 mLs Intravenous Stopped 23 1526)      Assessments:  1420 I obtained history and examined the patient as noted above.    Independent Interpretation (X-rays, CTs, rhythm strip):  None    Consultations/Discussion of Management or Tests:  Hospital medicine physician at [facility]    Social Determinants of Health affecting care:  None     Disposition:  The patient was transferred to [facility] via EMS.    Impression & Plan        Medical Decision Makin-year-old male presents with significant weight loss, dizziness, fatigue, polyuria and polydipsia.  Patient was found to have new onset diabetes as a clear source of his symptoms.  Blood glucose 400 with associated diabetic ketoacidosis.  He has no infectious symptoms or signs of acute bacterial infection at this time although urinalysis is pending.  Patient given 2 L of fluid and initiated on insulin infusion after electrolytes returned with potassium of 5.3.  He has been hemodynamically stable.  Unfortunately no beds available at [facility] thus bed obtained at [facility] and graciously accepted by hospital medicine service.  Patient safe for transfer.    Total critical care time excluding procedures: 30 minutes    Diagnosis:    ICD-10-CM    1. Diabetic ketoacidosis without coma associated with other specified diabetes mellitus (H)  E13.10            Discharge Medications:  New Prescriptions    No medications on file        Scribe Disclosure:  Sigrid NEWBERRY Hailie, am serving as a scribe at 2:13 PM on 2023 to document services personally performed by Vinicio Ruiz MD based on my observations and the provider's statements to me.      2023   Vinicio Ruiz MD Matthews, Jeremiah R, MD  23 [id number]

## 2023-06-06 NOTE — ED TRIAGE NOTES
"A&O x4, ABCs intact. Pt presents with vertigo \"everything spinning\" that he has had for about 4 or more days. Pt states that he isn't tolerating food well. He denies numbness or tingling, HA, visual changes.        Triage Assessment     Row Name 06/06/23 1211       Triage Assessment (Adult)    Airway WDL WDL       Respiratory WDL    Respiratory WDL WDL       Cardiac WDL    Cardiac WDL WDL              "

## 2023-06-07 LAB
ATRIAL RATE - MUSE: 91 BPM
DIASTOLIC BLOOD PRESSURE - MUSE: NORMAL MMHG
INTERPRETATION ECG - MUSE: NORMAL
P AXIS - MUSE: 24 DEGREES
PR INTERVAL - MUSE: 170 MS
QRS DURATION - MUSE: 74 MS
QT - MUSE: 348 MS
QTC - MUSE: 428 MS
R AXIS - MUSE: -4 DEGREES
SYSTOLIC BLOOD PRESSURE - MUSE: NORMAL MMHG
T AXIS - MUSE: -12 DEGREES
VENTRICULAR RATE- MUSE: 91 BPM

## 2023-11-04 ENCOUNTER — HEALTH MAINTENANCE LETTER (OUTPATIENT)
Age: 53
End: 2023-11-04

## 2024-03-23 ENCOUNTER — HEALTH MAINTENANCE LETTER (OUTPATIENT)
Age: 54
End: 2024-03-23

## 2024-06-01 ENCOUNTER — HEALTH MAINTENANCE LETTER (OUTPATIENT)
Age: 54
End: 2024-06-01

## 2024-08-10 ENCOUNTER — HEALTH MAINTENANCE LETTER (OUTPATIENT)
Age: 54
End: 2024-08-10

## 2024-12-22 ENCOUNTER — HEALTH MAINTENANCE LETTER (OUTPATIENT)
Age: 54
End: 2024-12-22

## 2025-04-12 ENCOUNTER — HEALTH MAINTENANCE LETTER (OUTPATIENT)
Age: 55
End: 2025-04-12

## 2025-06-14 ENCOUNTER — HEALTH MAINTENANCE LETTER (OUTPATIENT)
Age: 55
End: 2025-06-14

## 2025-07-26 ENCOUNTER — HEALTH MAINTENANCE LETTER (OUTPATIENT)
Age: 55
End: 2025-07-26

## (undated) RX ORDER — BUPIVACAINE HYDROCHLORIDE 5 MG/ML
INJECTION, SOLUTION PERINEURAL
Status: DISPENSED
Start: 2022-08-20